# Patient Record
Sex: FEMALE | Race: WHITE | Employment: FULL TIME | URBAN - METROPOLITAN AREA
[De-identification: names, ages, dates, MRNs, and addresses within clinical notes are randomized per-mention and may not be internally consistent; named-entity substitution may affect disease eponyms.]

---

## 2017-07-10 ENCOUNTER — COMMUNICATION - HEALTHEAST (OUTPATIENT)
Dept: FAMILY MEDICINE | Facility: CLINIC | Age: 47
End: 2017-07-10

## 2017-08-06 ENCOUNTER — OFFICE VISIT - HEALTHEAST (OUTPATIENT)
Dept: FAMILY MEDICINE | Facility: CLINIC | Age: 47
End: 2017-08-06

## 2017-08-06 DIAGNOSIS — S39.012A LOW BACK STRAIN, INITIAL ENCOUNTER: ICD-10-CM

## 2020-04-01 ENCOUNTER — OFFICE VISIT (OUTPATIENT)
Dept: URGENT CARE | Facility: URGENT CARE | Age: 50
End: 2020-04-01
Payer: COMMERCIAL

## 2020-04-01 VITALS
SYSTOLIC BLOOD PRESSURE: 112 MMHG | WEIGHT: 136 LBS | HEART RATE: 83 BPM | OXYGEN SATURATION: 99 % | TEMPERATURE: 98.4 F | DIASTOLIC BLOOD PRESSURE: 73 MMHG

## 2020-04-01 DIAGNOSIS — R21 RASH AND NONSPECIFIC SKIN ERUPTION: Primary | ICD-10-CM

## 2020-04-01 PROCEDURE — 99203 OFFICE O/P NEW LOW 30 MIN: CPT | Performed by: FAMILY MEDICINE

## 2020-04-01 RX ORDER — TRIAMCINOLONE ACETONIDE 1 MG/G
CREAM TOPICAL 2 TIMES DAILY
Qty: 80 G | Refills: 1 | Status: SHIPPED | OUTPATIENT
Start: 2020-04-01

## 2020-04-01 NOTE — PATIENT INSTRUCTIONS
"Take benadryl 50 mg every 6 hours as needed for itchiness/allergic reaction  Okay to apply steroid cream to rash twice a day, avoid eye area  Okay to take claritin, zyrtec, or allergra - 2 tablets daily for allergic reaction    Avoid triggers if able to identify      Patient Education     Contact Dermatitis  Contact dermatitis is a skin rash caused by something that touches the skin and makes it irritated and inflamed. Your skin may be red, swollen, dry, and may be cracked. Blisters may form and ooze. The rash will itch.  Contact dermatitis can form on the face and neck, backs of hands, forearms, genitals, and lower legs.  People can get contact dermatitis from lots of sources. These include:    Plants such as poison ivy, oak, or sumac    Chemicals in hair dyes and rinses, soaps, solvents, waxes, fingernail polish, and deodorants     Jewelry or watchbands made of nickel  Contact dermatitis is not passed from person to person.  Talk with your healthcare provider about what may have caused the rash. A type of allergy testing called \"patch testing\" may be used to discover what you are allergic to. You will need to avoid the source of your rash in the future to prevent it from coming back.  Treatment is done to relieve itching and prevent the rash from coming back. The rash should go away in a few days to a few weeks.  Home care  Your healthcare provider may prescribe medicine to relieve swelling and itching. Follow all instructions when using these medicines.  General care:    Avoid anything that heats up your skin, such as hot showers or baths, or direct sunlight. This can make itching worse.    Apply cold compresses to soothe your sores to help relieve your symptoms. Do this for 30 minutes 3 to 4 times a day. You can make a cold compress by soaking a cloth in cold water. Squeeze out excess water. You can add colloidal oatmeal to the water to help reduce itching. For severe itching in a small area, apply an ice pack " wrapped in a thin towel. Do this for 20 minutes 3 to 4 times a day.    You can also try wet dressings. One way to do this is to wear a wet piece of clothing under a dry one. Wear a damp shirt under a dry shirt if your upper body is affected. This can relieve itching and prevent you from scratching the affected area.    You can also help relieve large areas of itching by taking a lukewarm bath with colloidal oatmeal added to the water.    Use hydrocortisone cream for redness and irritation, unless another medicine was prescribed. You can also use benzocaine anesthetic cream or spray. Calamine lotion can also relieve mild symptoms.    Use oral diphenhydramine to help reduce itching. You can buy this antihistamine at drug and grocery stores. It can make you sleepy, so use lower doses during the daytime. Or you can use loratadine. This is an antihistamine that will not make you sleepy. Do not use diphenhydramine if you have glaucoma or have trouble urinating due to an enlarged prostate.    If a plant causes your rash, make sure to wash your skin and the clothes you were wearing when you came into contact with the plant. This is to wash away the plant oils that gave you the rash and prevent more or worse symptoms.    Stay away from the substance or object that causes your symptoms. If you can t avoid it, wear gloves or some other type of protection.  Follow-up care  Follow up with your healthcare provider, or as advised.  When to seek medical advice  Call your healthcare provider right away if any of these occur:    Spreading of the rash to other parts of your body    Severe swelling of your face, eyelids, mouth, throat or tongue    Trouble urinating due to swelling in the genital area    Fever of 100.4 F (38 C) or higher    Redness or swelling that gets worse    Pain that gets worse    Foul-smelling fluid leaking from the skin    Yellow-brown crusts on the open blisters  Date Last Reviewed: 9/1/2016 2000-2019 [employer]  [employer]. [address] [place] [address], [place] [address]. All rights reserved. This information is not intended as a substitute for professional medical care. Always follow your healthcare professional's instructions.

## 2020-04-01 NOTE — PROGRESS NOTES
"SUBJECTIVE:  [facility] 49 [facility] who presents to the clinic today for a rash.    Onset of rash was 1 month(s) ago.  Initially started on right side of neck, was itchy and almost \"hive\" like.  This was a bump and ended up looking like a burn which lasted for about 1 week.  This resolved without any treatment but then developed similar rash on left side of cheek and has been present for over week.  Overnight developed rash on right side of neck again and on face and forehead.    No one else with similar symptoms.  New moisturizer but had used for a while already without any problems.  Only applying homeopathic oil to area.    Denies any problems with sensitive skin  Works as PICC nurse in hospital.      History reviewed. No pertinent past medical history.  Current Outpatient Medications   Medication Sig Dispense Refill     triamcinolone (KENALOG) 0.1 % external cream Apply topically 2 times daily 80 g 1     Social History     Tobacco Use     Smoking status: Never Smoker     Smokeless tobacco: Never Used   Substance Use Topics     Alcohol use: Not on file       ROS:  Review of systems negative except as stated above.    EXAM:   /73   Pulse 83   Temp 98.4  F (36.9  C) (Tympanic)   Wt 61.7 kg (136 lb)   SpO2 99%   GENERAL: alert, no acute distress.  SKIN: redden patch on right neck, few scattered redden patches on right side of cheek/face.  Right forehead with faint pink patches with few scattered maculopapules.  No pustules.  Left side of cheek with irregular patch with slight thicken/scab like appearance, no purulent drainage.  PSYCH: mentation appears normal and affect normal/bright    ASSESSMENT/PLAN:  (R21) Rash and nonspecific skin eruption  (primary encounter diagnosis)  Plan: triamcinolone (KENALOG) 0.1 % external cream            Suspect possible contact dermatitis/exposure.  Encourage to avoid triggers if able to identify.  Recommend benadryl, " claritin/zyrtec/allegra.  RX triamcinolone cream to apply to area twice a day.  Discussed possible need for allergy testing and this can be thru primary provider vs allergist.    Follow up with primary provider or dermatology in 2 weeks if no resolution of symptoms.    Clayton Cook MD, MD  April 1, 2020 1:33 PM

## 2021-01-04 ENCOUNTER — HEALTH MAINTENANCE LETTER (OUTPATIENT)
Age: 51
End: 2021-01-04

## 2021-01-14 DIAGNOSIS — Z52.3 BONE MARROW DONOR: Primary | ICD-10-CM

## 2021-05-28 ENCOUNTER — RECORDS - HEALTHEAST (OUTPATIENT)
Dept: ADMINISTRATIVE | Facility: CLINIC | Age: 51
End: 2021-05-28

## 2021-05-30 ENCOUNTER — RECORDS - HEALTHEAST (OUTPATIENT)
Dept: ADMINISTRATIVE | Facility: CLINIC | Age: 51
End: 2021-05-30

## 2021-05-31 VITALS — BODY MASS INDEX: 25.52 KG/M2 | WEIGHT: 141.8 LBS

## 2021-06-02 ENCOUNTER — RECORDS - HEALTHEAST (OUTPATIENT)
Dept: ADMINISTRATIVE | Facility: CLINIC | Age: 51
End: 2021-06-02

## 2021-06-12 NOTE — PROGRESS NOTES
Chief Complaint   Patient presents with     Back Pain     lifting x 3 days        History of Present Illness: Nursing notes reviewed. Patient was dong some squats with weights/exercise, when she felt discomfort in spine area. She has pain with walking now in central lumbar area going up to just below her bra line. No UTI symptoms. No problems with sitting, but moving to standing position hurts.      Review of systems: See history of present illness, otherwise negative.     Current Outpatient Prescriptions   Medication Sig Dispense Refill     cyclobenzaprine (FLEXERIL) 10 MG tablet Take 1 tablet (10 mg total) by mouth 3 (three) times a day as needed for muscle spasms. 10 tablet 0     No current facility-administered medications for this visit.        No past medical history on file.   Past Surgical History:   Procedure Laterality Date      SECTION, CLASSIC        Social History     Social History     Marital status:      Spouse name: N/A     Number of children: N/A     Years of education: N/A     Social History Main Topics     Smoking status: Never Smoker     Smokeless tobacco: None     Alcohol use 3.0 oz/week     5 Glasses of wine per week      Comment: occasional     Drug use: No     Sexual activity: Yes     Partners: Male     Birth control/ protection: None     Other Topics Concern     None     Social History Narrative       History   Smoking Status     Never Smoker   Smokeless Tobacco     Not on file      Exam:   Blood pressure 100/62, pulse 78, temperature 98.1  F (36.7  C), temperature source Oral, resp. rate 16, weight 141 lb 12.8 oz (64.3 kg), SpO2 99 %.    EXAM:   General: Vital signs reviewed. Patient is in no acute appearing distress when sitting in room chair. Breathing is non labored appearing. Patient is alert and oriented x 3.   Back exam shows she tends to get up more slowly then usual from sitting to standing, and she supports herself on thighs with hands while forward bending. No  "radiation of pain in to lower extremities with doing this. She states her lower back feels \"tight\" with forward bending. Her lower back also feels \"tight\" with backward extension, side bending, and rotation, which she can do in normal range of motion. No areas of back tenderness.    Assessment/Plan   1. Low back strain, initial encounter  cyclobenzaprine (FLEXERIL) 10 MG tablet       There are no Patient Instructions on file for this visit.   Juan Whalen, DO  "

## 2021-10-10 ENCOUNTER — HEALTH MAINTENANCE LETTER (OUTPATIENT)
Age: 51
End: 2021-10-10

## 2022-01-30 ENCOUNTER — HEALTH MAINTENANCE LETTER (OUTPATIENT)
Age: 52
End: 2022-01-30

## 2022-09-24 ENCOUNTER — HEALTH MAINTENANCE LETTER (OUTPATIENT)
Age: 52
End: 2022-09-24

## 2023-05-08 ENCOUNTER — HEALTH MAINTENANCE LETTER (OUTPATIENT)
Age: 53
End: 2023-05-08